# Patient Record
Sex: FEMALE | Race: WHITE | NOT HISPANIC OR LATINO | Employment: UNEMPLOYED | URBAN - METROPOLITAN AREA
[De-identification: names, ages, dates, MRNs, and addresses within clinical notes are randomized per-mention and may not be internally consistent; named-entity substitution may affect disease eponyms.]

---

## 2022-01-14 RX ORDER — METHOCARBAMOL 500 MG/1
500 TABLET, FILM COATED ORAL 3 TIMES DAILY PRN
COMMUNITY
Start: 2021-12-22

## 2022-01-14 RX ORDER — ROSUVASTATIN CALCIUM 10 MG/1
10 TABLET, COATED ORAL EVERY EVENING
COMMUNITY
Start: 2021-11-23

## 2022-01-14 RX ORDER — MAGNESIUM GLUCONATE 27 MG(500)
500 TABLET ORAL DAILY
COMMUNITY

## 2022-01-14 RX ORDER — ESCITALOPRAM OXALATE 5 MG/1
5 TABLET ORAL DAILY
COMMUNITY
Start: 2021-07-20 | End: 2022-01-16

## 2022-01-14 RX ORDER — NAPROXEN 500 MG/1
500 TABLET ORAL 2 TIMES DAILY WITH MEALS
COMMUNITY
Start: 2022-01-07

## 2022-01-14 NOTE — PRE-PROCEDURE INSTRUCTIONS
Pre-Surgery Instructions:   Medication Instructions    escitalopram (LEXAPRO) 5 mg tablet Instructed patient per Anesthesia Guidelines  cont, take dos    insulin aspart (NovoLOG) 100 units/mL injection Instructed patient per Anesthesia Guidelines  pt will monitor, as [facility] #, will call with any problems    methocarbamol (ROBAXIN) 500 mg tablet Instructed patient per Anesthesia Guidelines  cont if needed, may take dos    naproxen (NAPROSYN) 500 mg tablet Instructed patient per Anesthesia Guidelines  cont as needed, avoid 3 days prior to sx    rosuvastatin (CRESTOR) 10 MG tablet Instructed patient per Anesthesia Guidelines  cont, hs as usual    You will receive a phone call from hospital for arrival time  Please call surgeons office if any changes in your condition  Wear easy on/off clothing; consider type of surgery;  Valuables, jewelry, piercing's please keep at home  **COVID-19  education/surgical guidelines  Updated covid    Visitation policy  Please: No contact lenses or eye make up, artificial eyelashes    Please bring special ordered sling or braces if needed for  Your particular surgery  Please secure transportation     Follow pre surgery showering or cleaning instructions as  Reviewed by nurse or surgeons office      Questions answered and concerns addressed

## 2022-01-19 ENCOUNTER — ANESTHESIA (OUTPATIENT)
Dept: PERIOP | Facility: HOSPITAL | Age: 37
End: 2022-01-19
Payer: COMMERCIAL

## 2022-01-19 ENCOUNTER — HOSPITAL ENCOUNTER (OUTPATIENT)
Facility: HOSPITAL | Age: 37
Setting detail: OUTPATIENT SURGERY
Discharge: HOME | End: 2022-01-19
Attending: PLASTIC SURGERY | Admitting: PLASTIC SURGERY
Payer: COMMERCIAL

## 2022-01-19 ENCOUNTER — ANESTHESIA EVENT (OUTPATIENT)
Dept: PERIOP | Facility: HOSPITAL | Age: 37
End: 2022-01-19
Payer: COMMERCIAL

## 2022-01-19 VITALS
SYSTOLIC BLOOD PRESSURE: 127 MMHG | DIASTOLIC BLOOD PRESSURE: 73 MMHG | HEIGHT: 63 IN | RESPIRATION RATE: 18 BRPM | TEMPERATURE: 97 F | OXYGEN SATURATION: 98 % | HEART RATE: 86 BPM | BODY MASS INDEX: 28.35 KG/M2 | WEIGHT: 160 LBS

## 2022-01-19 DIAGNOSIS — D48.5 NEOPLASM OF UNCERTAIN BEHAVIOR OF SKIN: ICD-10-CM

## 2022-01-19 PROBLEM — E10.9 DIABETES MELLITUS TYPE 1 (HCC): Status: ACTIVE | Noted: 2022-01-19

## 2022-01-19 PROBLEM — E11.9 DIABETES MELLITUS, TYPE 2 (HCC): Status: ACTIVE | Noted: 2022-01-19

## 2022-01-19 PROBLEM — F32.A DEPRESSION: Status: ACTIVE | Noted: 2022-01-19

## 2022-01-19 PROBLEM — R51.9 HEADACHE: Status: ACTIVE | Noted: 2022-01-19

## 2022-01-19 LAB
EXT PREGNANCY TEST URINE: NEGATIVE
EXT. CONTROL: NORMAL
GLUCOSE SERPL-MCNC: 178 MG/DL (ref 65–140)
GLUCOSE SERPL-MCNC: 214 MG/DL (ref 65–140)

## 2022-01-19 PROCEDURE — 81025 URINE PREGNANCY TEST: CPT | Performed by: PLASTIC SURGERY

## 2022-01-19 PROCEDURE — 82948 REAGENT STRIP/BLOOD GLUCOSE: CPT

## 2022-01-19 PROCEDURE — 88304 TISSUE EXAM BY PATHOLOGIST: CPT | Performed by: STUDENT IN AN ORGANIZED HEALTH CARE EDUCATION/TRAINING PROGRAM

## 2022-01-19 RX ORDER — CEFAZOLIN SODIUM 1 G/50ML
1000 SOLUTION INTRAVENOUS ONCE
Status: COMPLETED | OUTPATIENT
Start: 2022-01-19 | End: 2022-01-19

## 2022-01-19 RX ORDER — MIDAZOLAM HYDROCHLORIDE 2 MG/2ML
INJECTION, SOLUTION INTRAMUSCULAR; INTRAVENOUS AS NEEDED
Status: DISCONTINUED | OUTPATIENT
Start: 2022-01-19 | End: 2022-01-19

## 2022-01-19 RX ORDER — FENTANYL CITRATE/PF 50 MCG/ML
25 SYRINGE (ML) INJECTION
Status: DISCONTINUED | OUTPATIENT
Start: 2022-01-19 | End: 2022-01-19 | Stop reason: HOSPADM

## 2022-01-19 RX ORDER — ONDANSETRON 2 MG/ML
4 INJECTION INTRAMUSCULAR; INTRAVENOUS EVERY 8 HOURS PRN
Status: DISCONTINUED | OUTPATIENT
Start: 2022-01-19 | End: 2022-01-19 | Stop reason: HOSPADM

## 2022-01-19 RX ORDER — ONDANSETRON 2 MG/ML
INJECTION INTRAMUSCULAR; INTRAVENOUS AS NEEDED
Status: DISCONTINUED | OUTPATIENT
Start: 2022-01-19 | End: 2022-01-19

## 2022-01-19 RX ORDER — ONDANSETRON 2 MG/ML
4 INJECTION INTRAMUSCULAR; INTRAVENOUS ONCE AS NEEDED
Status: DISCONTINUED | OUTPATIENT
Start: 2022-01-19 | End: 2022-01-19 | Stop reason: HOSPADM

## 2022-01-19 RX ORDER — PROPOFOL 10 MG/ML
INJECTION, EMULSION INTRAVENOUS CONTINUOUS PRN
Status: DISCONTINUED | OUTPATIENT
Start: 2022-01-19 | End: 2022-01-19

## 2022-01-19 RX ORDER — LIDOCAINE HYDROCHLORIDE AND EPINEPHRINE 10; 10 MG/ML; UG/ML
INJECTION, SOLUTION INFILTRATION; PERINEURAL AS NEEDED
Status: DISCONTINUED | OUTPATIENT
Start: 2022-01-19 | End: 2022-01-19 | Stop reason: HOSPADM

## 2022-01-19 RX ORDER — FENTANYL CITRATE 50 UG/ML
INJECTION, SOLUTION INTRAMUSCULAR; INTRAVENOUS AS NEEDED
Status: DISCONTINUED | OUTPATIENT
Start: 2022-01-19 | End: 2022-01-19

## 2022-01-19 RX ORDER — HYDROMORPHONE HCL/PF 1 MG/ML
0.2 SYRINGE (ML) INJECTION
Status: DISCONTINUED | OUTPATIENT
Start: 2022-01-19 | End: 2022-01-19 | Stop reason: HOSPADM

## 2022-01-19 RX ORDER — HYDROCODONE BITARTRATE AND ACETAMINOPHEN 5; 325 MG/1; MG/1
1 TABLET ORAL EVERY 4 HOURS PRN
Status: DISCONTINUED | OUTPATIENT
Start: 2022-01-19 | End: 2022-01-19 | Stop reason: HOSPADM

## 2022-01-19 RX ORDER — PROPOFOL 10 MG/ML
INJECTION, EMULSION INTRAVENOUS AS NEEDED
Status: DISCONTINUED | OUTPATIENT
Start: 2022-01-19 | End: 2022-01-19

## 2022-01-19 RX ORDER — GINSENG 100 MG
CAPSULE ORAL AS NEEDED
Status: DISCONTINUED | OUTPATIENT
Start: 2022-01-19 | End: 2022-01-19 | Stop reason: HOSPADM

## 2022-01-19 RX ORDER — SODIUM CHLORIDE, SODIUM LACTATE, POTASSIUM CHLORIDE, CALCIUM CHLORIDE 600; 310; 30; 20 MG/100ML; MG/100ML; MG/100ML; MG/100ML
INJECTION, SOLUTION INTRAVENOUS CONTINUOUS PRN
Status: DISCONTINUED | OUTPATIENT
Start: 2022-01-19 | End: 2022-01-19

## 2022-01-19 RX ORDER — MAGNESIUM HYDROXIDE 1200 MG/15ML
LIQUID ORAL AS NEEDED
Status: DISCONTINUED | OUTPATIENT
Start: 2022-01-19 | End: 2022-01-19 | Stop reason: HOSPADM

## 2022-01-19 RX ADMIN — PROPOFOL 100 MCG/KG/MIN: 10 INJECTION, EMULSION INTRAVENOUS at 09:08

## 2022-01-19 RX ADMIN — FENTANYL CITRATE 25 MCG: 50 INJECTION, SOLUTION INTRAMUSCULAR; INTRAVENOUS at 09:08

## 2022-01-19 RX ADMIN — ONDANSETRON 4 MG: 2 INJECTION INTRAMUSCULAR; INTRAVENOUS at 09:29

## 2022-01-19 RX ADMIN — PROPOFOL 50 MG: 10 INJECTION, EMULSION INTRAVENOUS at 09:12

## 2022-01-19 RX ADMIN — SODIUM CHLORIDE, SODIUM LACTATE, POTASSIUM CHLORIDE, AND CALCIUM CHLORIDE: .6; .31; .03; .02 INJECTION, SOLUTION INTRAVENOUS at 09:01

## 2022-01-19 RX ADMIN — MIDAZOLAM 2 MG: 1 INJECTION INTRAMUSCULAR; INTRAVENOUS at 09:01

## 2022-01-19 RX ADMIN — FENTANYL CITRATE 25 MCG: 50 INJECTION, SOLUTION INTRAMUSCULAR; INTRAVENOUS at 09:28

## 2022-01-19 RX ADMIN — FENTANYL CITRATE 25 MCG: 50 INJECTION, SOLUTION INTRAMUSCULAR; INTRAVENOUS at 09:14

## 2022-01-19 RX ADMIN — PROPOFOL 50 MG: 10 INJECTION, EMULSION INTRAVENOUS at 09:08

## 2022-01-19 RX ADMIN — CEFAZOLIN SODIUM 1000 MG: 1 SOLUTION INTRAVENOUS at 09:12

## 2022-01-19 NOTE — DISCHARGE INSTRUCTIONS
[facility], [address] [facility], [address] [facility], [facility], [address] [facility] /S / [employer]  [facility]       Avoid direct sunlight    No ice or heating pack    Ok to shower, avoid direct water pressure on incision    No exercise or outdoor activities    No strenuous activity    Keep your head elevated as much as possible    Apply antibiotic ointment (bacitracin or neosporin) to incision three times per day    Call [phone] for an appointment in 7-14 days

## 2022-01-19 NOTE — ANESTHESIA POSTPROCEDURE EVALUATION
Post-Op Assessment Note    CV Status:  Stable  Pain Score: 0    Pain management: adequate     Mental Status:  Alert and awake   Hydration Status:  Euvolemic   PONV Controlled:  Controlled   Airway Patency:  Patent      Post Op Vitals Reviewed: Yes      Staff: CRNA         No complications documented      BP (P) 107/54 (01/19/22 0936)    Temp     Pulse  96   Resp (P) 20 (01/19/22 0936)    SpO2 100%

## 2022-01-19 NOTE — DISCHARGE SUMMARY
Discharge Summary - Medical Flor Jacobs 43 y o  female MRN: [id number]    [address] PACU Room / Bed: OR POOL/OR POOL Encounter: [id number]    BRIEF OVERVIEW  Admitting Provider: Sunday Damon MD  Discharge Provider: Sunday Damon MD  Primary Care Physician at Discharge: Ifrah Arauz [phone]    Discharge To: Home      Admission Date: 1/19/2022     Discharge Date: No discharge date for patient encounter  Code Status: No Order  Advance Directive and Living Will: <no information>  Power of :        Primary Discharge Diagnosis  Active Problems:    Depression    Headache    Diabetes mellitus type 1 ([facility] [address] )  Resolved Problems:    * No resolved hospital problems   *        Discharge Disposition: Final discharge disposition not confirmed            [address] [place] [address]    Presenting Problem/History of Present Illness  <principal problem not specified>      Discharge Condition: stable    Patient tolerated the procedure well, recovered and was discharged home in stable condition    Sunday Damon MD  1/19/2022  9:46 AM

## 2022-01-19 NOTE — ANESTHESIA PREPROCEDURE EVALUATION
Procedure:  EXCISION OF SCALP MASS (N/A Head)    Relevant Problems   ENDO   (+) Diabetes mellitus type 1 ([facility] [address] )      NEURO/PSYCH   (+) Depression   (+) Headache      Results for Gene Mcnair (MRN [id number]) as of 1/19/2022 08:25   Ref  Range 1/19/2022 08:20   PREGNANCY TEST URINE Latest Ref Range: Negative  Negative     Physical Exam    Airway    Mallampati score: I  TM Distance: >3 FB  Neck ROM: full     Dental   No notable dental hx     Cardiovascular      Pulmonary      Other Findings        Anesthesia Plan  ASA Score- 2     Anesthesia Type- IV sedation with anesthesia with ASA Monitors  Additional Monitors:   Airway Plan:           Plan Factors-Exercise tolerance (METS): >4 METS  Chart reviewed  Existing labs reviewed  Patient summary reviewed  Patient is not a current smoker  Patient did not smoke on day of surgery  Induction- intravenous  Postoperative Plan- Plan for postoperative opioid use  Informed Consent- Anesthetic plan and risks discussed with patient  I personally reviewed this patient with the CRNA  Discussed and agreed on the Anesthesia Plan with the CRNA  Azeem Johansen

## 2022-01-19 NOTE — OP NOTE
PERATIVE REPORT  PATIENT NAME: Liliana Estevez    :  1985  MRN: [id number]  Pt Location:  OR ROOM 10    SURGERY DATE: 2022    Surgeon(s) and Role:     * Roni Kevin MD - Primary    Preop Diagnosis:  Neoplasm of uncertain behavior of skin [D48 5]    Post-Op Diagnosis Codes: * Neoplasm of uncertain behavior of skin [D48 5]    Procedure(s) (LRB):  EXCISION OF SCALP MASS (N/A)    Specimen(s):  ID Type Source Tests Collected by Time Destination   1 : SCALP MASS Tissue Mass TISSUE EXAM Winter Gutiérrez MD 2022 0919        Estimated Blood Loss:   Minimal    Drains:  * No LDAs found *    Anesthesia Type:   IV Sedation with Anesthesia    Operative Indications:  Neoplasm of uncertain behavior of skin [D48 5]      Operative Findings:      Complications:   None    Procedure and Technique:  The patient was brought to the operating room and placed supine on the operating room table  Time-out procedure was performed SCDs were applied and IV antibiotics were given  After adequate anesthesia was obtained the scalp was prepped and draped using standard surgical technique  Attention was turned to the scalp mass  A 3 cm elliptical design was made overlying the mass to remove the damaged skin  This pattern was incised with a 15 blade scalpel and dissection was carried through the subcutaneous tissue at which point the mass was noted  This was circumferentially dissected free and was found extending deep to the galea  This measured 2 2 cm  This was sent off for final pathology  The wound was copiously irrigated and excellent hemostasis was achieved  The galea and deep dermis were closed using 4-0 Vicryl suture in interrupted technique  The superficial skin was closed using 5 0 chromic suture in interrupted technique bacitracin ointment was applied     I was present for the entire procedure and A qualified resident physician was not available    Patient Disposition:  hemodynamically stable      SIGNATURE: Gabrielle Houser MD  DATE: January 19, 2022  TIME: 9:43 AM

## (undated) DEVICE — STERILE POLYISOPRENE POWDER-FREE SURGICAL GLOVES WITH EMOLLIENT COATING: Brand: PROTEXIS

## (undated) DEVICE — BASIC PACK: Brand: CONVERTORS

## (undated) DEVICE — STANDARD SURGICAL GOWN, L: Brand: CONVERTORS

## (undated) DEVICE — UNDERGLOVE PROTEXIS  BLUE SZ 7

## (undated) DEVICE — SYRINGE 30ML LL

## (undated) DEVICE — SPONGE 4 X 4 XRAY 16 PLY STRL LF RFD

## (undated) DEVICE — NEEDLE 25G X 1 1/2

## (undated) DEVICE — SCD SEQUENTIAL COMPRESSION COMFORT SLEEVE MEDIUM KNEE LENGTH: Brand: KENDALL SCD

## (undated) DEVICE — SUT CHROMIC 5-0 P-3 18 IN 687G

## (undated) DEVICE — SPONGE STICK WITH PVP-I: Brand: KENDALL

## (undated) DEVICE — 1820 FOAM BLOCK NEEDLE COUNTER: Brand: DEVON

## (undated) DEVICE — SUT VICRYL 4-0 PS-2 18 IN J496G

## (undated) DEVICE — SPONGE LAP 18 X 18 IN STRL RFD

## (undated) DEVICE — STERILE POLYISOPRENE POWDER-FREE SURGICAL GLOVES: Brand: PROTEXIS

## (undated) DEVICE — CABLE BIPOLAR DISP MEGADYNE

## (undated) DEVICE — LIGHT GLOVE GREEN

## (undated) DEVICE — NEEDLE BLUNT 18 G X 1 1/2IN

## (undated) DEVICE — INTENDED FOR TISSUE SEPARATION, AND OTHER PROCEDURES THAT REQUIRE A SHARP SURGICAL BLADE TO PUNCTURE OR CUT.: Brand: BARD-PARKER ® SAFETYLOCK CARBON RIB-BACK BLADES

## (undated) DEVICE — TIBURON SPLIT SHEET: Brand: CONVERTORS

## (undated) DEVICE — SKIN MARKER DUAL TIP WITH RULER CAP, FLEXIBLE RULER AND LABELS: Brand: DEVON

## (undated) DEVICE — SURGICAL CLIPPER BLADE GENERAL USE

## (undated) DEVICE — SUT VICRYL 4-0 P-3 18 IN J494G